# Patient Record
Sex: MALE | Race: WHITE | Employment: UNEMPLOYED | URBAN - METROPOLITAN AREA
[De-identification: names, ages, dates, MRNs, and addresses within clinical notes are randomized per-mention and may not be internally consistent; named-entity substitution may affect disease eponyms.]

---

## 2019-08-15 LAB
AMB EXT CHOLESTEROL, TOTAL: 255 MG/DL
AMB EXT CREATININE: 1.22 MG/DL
AMB EXT GLUCOSE: 103 MG/DL
AMB EXT HDL CHOLESTEROL: 38 MG/DL
AMB EXT LDL CHOLESTEROL, DIRECT: 182 MG/DL
AMB EXT TRIGLYCERIDES: 174 MG/DL
AMB EXT VLDL: 35 MG/DL

## 2019-09-05 ENCOUNTER — OFFICE VISIT (OUTPATIENT)
Dept: FAMILY MEDICINE CLINIC | Facility: CLINIC | Age: 34
End: 2019-09-05
Payer: COMMERCIAL

## 2019-09-05 VITALS
RESPIRATION RATE: 17 BRPM | OXYGEN SATURATION: 98 % | HEIGHT: 70 IN | BODY MASS INDEX: 35.65 KG/M2 | DIASTOLIC BLOOD PRESSURE: 78 MMHG | HEART RATE: 76 BPM | WEIGHT: 249 LBS | TEMPERATURE: 99 F | SYSTOLIC BLOOD PRESSURE: 116 MMHG

## 2019-09-05 DIAGNOSIS — Z13.29 SCREENING FOR ENDOCRINE, NUTRITIONAL, METABOLIC AND IMMUNITY DISORDER: ICD-10-CM

## 2019-09-05 DIAGNOSIS — Z13.21 SCREENING FOR ENDOCRINE, NUTRITIONAL, METABOLIC AND IMMUNITY DISORDER: ICD-10-CM

## 2019-09-05 DIAGNOSIS — E78.00 HYPERCHOLESTEROLEMIA: ICD-10-CM

## 2019-09-05 DIAGNOSIS — Z87.898 HISTORY OF ABDOMINAL PAIN: ICD-10-CM

## 2019-09-05 DIAGNOSIS — Z13.228 SCREENING FOR ENDOCRINE, NUTRITIONAL, METABOLIC AND IMMUNITY DISORDER: ICD-10-CM

## 2019-09-05 DIAGNOSIS — E66.9 OBESITY (BMI 30-39.9): ICD-10-CM

## 2019-09-05 DIAGNOSIS — F17.200 TOBACCO USE DISORDER: ICD-10-CM

## 2019-09-05 DIAGNOSIS — Z13.0 SCREENING FOR ENDOCRINE, NUTRITIONAL, METABOLIC AND IMMUNITY DISORDER: ICD-10-CM

## 2019-09-05 DIAGNOSIS — Z00.00 ENCOUNTER FOR ANNUAL PHYSICAL EXAM: Primary | ICD-10-CM

## 2019-09-05 DIAGNOSIS — R22.2 SUBCUTANEOUS MASS OF ABDOMINAL WALL: ICD-10-CM

## 2019-09-05 DIAGNOSIS — D17.22 LIPOMA OF LEFT FOREARM: ICD-10-CM

## 2019-09-05 PROCEDURE — 99385 PREV VISIT NEW AGE 18-39: CPT | Performed by: EMERGENCY MEDICINE

## 2019-09-05 NOTE — PROGRESS NOTES
Chief Complaint:   Patient presents with:  Physical: NP, annual physical     HPI:   This is a 29year old male who present for a yearly annual exam    WELL-MALE EXAM     1. Age:   29   2. Have you had any of the following problems:         a.  High bloo injury denies any discharge or redness to the area. [address] [facility]   History reviewed. No pertinent past medical history.   Past Surgical History:   Procedure Laterality Date   • WISDOM TEETH REMOVED       Social History:  Social History    Tobacco Use      Smok lipoma  GI:soft Nontender Normoactive BS.   No palpable masses no guarding rigidity no rebound tenderness, soft subcutaneous mass palpated over the right upper quadrant appears to be superficial oval in shape approximately 1 cm suspicious for lipoma or seba for endocrine, nutritional, metabolic and immunity disorder  - LIPID PANEL; Future  - TSH W REFLEX TO FREE T4; Future  - CBC WITH DIFFERENTIAL WITH PLATELET; Future    8. Tobacco use disorder   Smoking cessation counseling provided.   Including assessment o

## 2019-09-05 NOTE — PATIENT INSTRUCTIONS
Thank you for choosing [facility]  To Do:  FOR [address]    · Repeat blood tests in 1 month for cholesterol  · Need to stop aor slow down smokine  · Arrange for ultrasound of abdominal wall  · Follow up yearly or as needed        Well ba Obesity All men in this age group At routine exams   Syphilis Men at increased risk for infection – talk with your healthcare provider At routine exams   Tuberculosis Men at increased risk for infection – talk with your healthcare provider Check with you Tetanus/diphtheria/pertussis (Td/Tdap) booster All men in this age group A one-time Tdap booster after age 25, then Td every10 years   Counseling Who needs it How often   Diet and exercise Overweight or obese people When diagnosed, and then at routine ex

## 2019-09-06 ENCOUNTER — MED REC SCAN ONLY (OUTPATIENT)
Dept: FAMILY MEDICINE CLINIC | Facility: CLINIC | Age: 34
End: 2019-09-06

## 2019-09-19 ENCOUNTER — OFFICE VISIT (OUTPATIENT)
Dept: SURGERY | Facility: CLINIC | Age: 34
End: 2019-09-19
Payer: COMMERCIAL

## 2019-09-19 VITALS
WEIGHT: 249 LBS | DIASTOLIC BLOOD PRESSURE: 74 MMHG | HEART RATE: 73 BPM | BODY MASS INDEX: 35.65 KG/M2 | TEMPERATURE: 99 F | SYSTOLIC BLOOD PRESSURE: 109 MMHG | HEIGHT: 70 IN

## 2019-09-19 DIAGNOSIS — D17.1 LIPOMA OF TORSO: Primary | ICD-10-CM

## 2019-09-19 PROCEDURE — 99242 OFF/OP CONSLTJ NEW/EST SF 20: CPT | Performed by: SURGERY

## 2019-10-07 NOTE — H&P
New Patient Visit Note       Active Problems      1. Lipoma of torso        Chief Complaint   Patient presents with:  Lump: New patient referred by Raphael Lantigua for lump on forearm. and abdomen. Allergies  Anjali Lundborg is allergic to tetracycline.     Past Medi place, and time. He appears well-developed and well-nourished. HENT:   Head: Normocephalic and atraumatic. Eyes: Pupils are equal, round, and reactive to light. EOM are normal.   Cardiovascular: Normal rate and regular rhythm.    Pulmonary/Chest: Effort patient in detail. Expected postoperative pain, recuperation and postoperative course was also reviewed. All questions from the patient were answered in detail.   The patient did verbalize understanding and does not have any further questions at this osvaldo

## 2021-08-18 ENCOUNTER — TELEPHONE (OUTPATIENT)
Dept: SCHEDULING | Age: 36
End: 2021-08-18

## 2021-08-18 DIAGNOSIS — Z13.6 SCREENING FOR ISCHEMIC HEART DISEASE: Primary | ICD-10-CM

## 2021-09-10 ENCOUNTER — HOSPITAL ENCOUNTER (OUTPATIENT)
Dept: CT IMAGING | Age: 36
Discharge: HOME | End: 2021-09-10
Attending: INTERNAL MEDICINE

## 2021-09-10 ENCOUNTER — HOSPITAL ENCOUNTER (OUTPATIENT)
Dept: CT IMAGING | Age: 36
Discharge: HOME | End: 2021-09-10

## 2021-09-10 DIAGNOSIS — Z13.6 SCREENING FOR ISCHEMIC HEART DISEASE: ICD-10-CM

## 2021-09-10 PROCEDURE — 75571 CT HRT W/O DYE W/CA TEST: CPT | Performed by: INTERNAL MEDICINE

## 2021-09-10 PROCEDURE — 75571 CT HRT W/O DYE W/CA TEST: CPT

## 2021-09-16 ENCOUNTER — TELEPHONE (OUTPATIENT)
Dept: CT IMAGING | Age: 36
End: 2021-09-16

## 2021-09-20 ENCOUNTER — TELEPHONE (OUTPATIENT)
Dept: CARDIOLOGY | Age: 36
End: 2021-09-20

## 2023-02-08 ENCOUNTER — TELEPHONE (OUTPATIENT)
Dept: CARDIOLOGY | Age: 38
End: 2023-02-08

## 2023-02-10 ENCOUNTER — APPOINTMENT (OUTPATIENT)
Dept: CT IMAGING | Age: 38
End: 2023-02-10

## 2023-11-09 ENCOUNTER — OFFICE VISIT (OUTPATIENT)
Dept: ORTHOPEDIC SURGERY | Facility: CLINIC | Age: 38
End: 2023-11-09
Payer: COMMERCIAL

## 2023-11-09 VITALS
DIASTOLIC BLOOD PRESSURE: 78 MMHG | BODY MASS INDEX: 37.1 KG/M2 | HEIGHT: 71 IN | SYSTOLIC BLOOD PRESSURE: 113 MMHG | WEIGHT: 265 LBS | HEART RATE: 79 BPM | OXYGEN SATURATION: 94 %

## 2023-11-09 DIAGNOSIS — M25.561 RIGHT KNEE PAIN, UNSPECIFIED CHRONICITY: Primary | ICD-10-CM

## 2023-11-09 DIAGNOSIS — S89.91XA RIGHT KNEE INJURY, INITIAL ENCOUNTER: ICD-10-CM

## 2023-11-09 RX ORDER — METHYLPREDNISOLONE 4 MG/1
1 TABLET ORAL DAILY
Qty: 21 TABLET | Refills: 0 | Status: SHIPPED | OUTPATIENT
Start: 2023-11-09

## 2023-11-09 NOTE — PROGRESS NOTES
"Chief Complaint  Pain and Initial Evaluation of the Right Knee     Subjective      Loyd Cuellar presents to [facility] for initial evaluation of the right knee. He has had pain for years.  Usually with leg presses and extensions it will hurt but it is manageable.  He notes if he runs or moves a lot it goes away.  He was doing leg extensions and on his last set and rep 6 his knee moved weird.  He has went to  yesterday and had X rays and given crutches. He was put on anti inflammatory.     Allergies   Allergen Reactions    Tetracycline Unknown - Low Severity and Unknown (See Comments)        Social History     Socioeconomic History    Marital status:    Tobacco Use    Smoking status: Every Day     Types: Cigarettes    Smokeless tobacco: Never        I reviewed the patient's chief complaint, history of present illness, review of systems, past medical history, surgical history, family history, social history, medications, and allergy list.     Review of Systems     Constitutional: Denies fevers, chills, weight loss  Cardiovascular: Denies chest pain, shortness of breath  Skin: Denies rashes, acute skin changes  Neurologic: Denies headache, loss of consciousness        Vital Signs:   /78   Pulse 79   Ht 180.3 cm (71\")   Wt 120 kg (265 lb)   SpO2 94%   BMI 36.96 kg/m²          Physical Exam  General: Alert. No acute distress    Ortho Exam        RIGHT KNEE Flexion 110. Extension 0. Stable to varus/valgus stress. Stable to anterior/posterior drawer. Neurovascularly intact. Negative Surekha. Negative Lachman. Positive EHL, FHL, HS and TA. Sensation intact to light touch all 5 nerves of the foot. Ambulates with Antalgic gait. Patella is well tracking. Calf supple, non-tender. Positive tenderness to the medial joint line. Positive tenderness to the lateral joint line. Negative Crepitus. Good strength to hamstrings, quadriceps, dorsiflexors, and plantar flexors.  Knee " Extensor Mechanism intact       Procedures      Imaging Results (Most Recent)       None             Result Review :       Xray showed no acute findings of the right knee on 11/8/23.     Assessment and Plan     Diagnoses and all orders for this visit:    1. Right knee pain, unspecified chronicity (Primary)    2. Right knee injury, initial encounter        Discussed the treatment plan with the patient. I reviewed the X-rays that were obtained 11/8/23 with the patient.     Continue brace as needed. Prescribed steroid pack.     Knee brace given.     Educated on risk of smoking/nicotine. Discussed options for smoking cessation. and Call or return if worsening symptoms.    Follow Up     4-6 weeks assess pain      Patient was given instructions and counseling regarding his condition or for health maintenance advice. Please see specific information pulled into the AVS if appropriate.     Scribed for Roberto Singh MD by Odilia Luque MA.  11/09/23   09:46 EST    I have personally performed the services described in this document as scribed by the above individual and it is both accurate and complete. Roberto Singh MD 11/09/23

## 2023-12-08 ENCOUNTER — OFFICE VISIT (OUTPATIENT)
Dept: ORTHOPEDIC SURGERY | Facility: CLINIC | Age: 38
End: 2023-12-08
Payer: COMMERCIAL

## 2023-12-08 VITALS — HEIGHT: 71 IN | WEIGHT: 265 LBS | BODY MASS INDEX: 37.1 KG/M2

## 2023-12-08 DIAGNOSIS — M25.561 RIGHT KNEE PAIN, UNSPECIFIED CHRONICITY: Primary | ICD-10-CM

## 2023-12-08 PROCEDURE — 99213 OFFICE O/P EST LOW 20 MIN: CPT | Performed by: PHYSICIAN ASSISTANT

## 2023-12-11 NOTE — PATIENT INSTRUCTIONS
Patient is doing very well.  Advised continued participation in home exercise program.  Avoid deep squatting or pivoting on the knee.  Continue icing and elevation as needed for pain or swelling.  Follow-up as needed.  Call with changes or concerns.

## 2023-12-11 NOTE — PROGRESS NOTES
"Chief Complaint  Follow-up and Pain of the Right Knee    Subjective      Loyd Cuellar presents to [facility] for follow-up of right knee injury.  He was initially evaluated in clinic on 11/9/2023 with review of x-rays showing no acute osseous abnormalities.  He was provided instructions for home exercise program and prescription for Medrol Dosepak.  He presents independently ambulatory without use of assistive device.  Reports that his knee is \"pretty good\".  We have been bracing as needed, however, reports that he has discontinued this.  He denies any feeling of knee instability.  He feels ready to return to normal activities, the gym, etc.    Objective   Allergies   Allergen Reactions    Tetracycline Unknown - Low Severity and Unknown (See Comments)       Vital Signs:   Ht 180.3 cm (71\")   Wt 120 kg (265 lb)   BMI 36.96 kg/m²       Physical Exam    Constitutional: Awake, alert. Well nourished appearance.    Integumentary: Warm, dry, intact. No obvious rashes.    HENT: Atraumatic, normocephalic.   Respiratory: Non labored respirations .   Cardiovascular: Intact peripheral pulses.    Psychiatric: Normal mood and affect. A&O X3    Ortho Exam  Right knee: Skin is warm, dry, and intact.  No tenderness to palpation of joint lines.  No edema.  Patella is well tracking and knee is stable to varus and valgus stress.  Full knee extension and flexion to 125 degrees.  Full plantarflexion and dorsiflexion of the ankle.  Sensation and distal neurovascular intact.  Smooth sit to stand transition.  Patient fully weightbearing with nonantalgic gait.    Imaging Results (Most Recent)       None                    Assessment and Plan   Problem List Items Addressed This Visit    None  Visit Diagnoses       Right knee pain, unspecified chronicity    -  Primary          Follow Up   No follow-ups on file.    Tobacco Use: High Risk (12/8/2023)    Patient History     Smoking Tobacco Use: Every Day     " Smokeless Tobacco Use: Never     Passive Exposure: Not on file     Educated on risk of smoking. Discussed options for smoking cessation.    Patient Instructions   Patient is doing very well.  Advised continued participation in home exercise program.  Avoid deep squatting or pivoting on the knee.  Continue icing and elevation as needed for pain or swelling.  Follow-up as needed.  Call with changes or concerns.  Patient was given instructions and counseling regarding his condition or for health maintenance advice. Please see specific information pulled into the AVS if appropriate.